# Patient Record
Sex: MALE | Race: BLACK OR AFRICAN AMERICAN | URBAN - METROPOLITAN AREA
[De-identification: names, ages, dates, MRNs, and addresses within clinical notes are randomized per-mention and may not be internally consistent; named-entity substitution may affect disease eponyms.]

---

## 2017-05-18 ENCOUNTER — OFFICE VISIT (OUTPATIENT)
Dept: INTERNAL MEDICINE | Facility: CLINIC | Age: 47
End: 2017-05-18
Payer: COMMERCIAL

## 2017-05-18 VITALS
HEART RATE: 67 BPM | TEMPERATURE: 98 F | BODY MASS INDEX: 26.83 KG/M2 | SYSTOLIC BLOOD PRESSURE: 132 MMHG | OXYGEN SATURATION: 99 % | DIASTOLIC BLOOD PRESSURE: 84 MMHG | WEIGHT: 177 LBS | HEIGHT: 68 IN

## 2017-05-18 DIAGNOSIS — R31.29 MICROSCOPIC HEMATURIA: ICD-10-CM

## 2017-05-18 DIAGNOSIS — R30.0 DYSURIA: Primary | ICD-10-CM

## 2017-05-18 LAB
ALBUMIN UR-MCNC: NEGATIVE MG/DL
ANION GAP SERPL CALCULATED.3IONS-SCNC: 9 MMOL/L (ref 3–14)
APPEARANCE UR: CLEAR
BACTERIA #/AREA URNS HPF: ABNORMAL /HPF
BILIRUB UR QL STRIP: NEGATIVE
BUN SERPL-MCNC: 16 MG/DL (ref 7–30)
CALCIUM SERPL-MCNC: 9.1 MG/DL (ref 8.5–10.1)
CHLORIDE SERPL-SCNC: 106 MMOL/L (ref 94–109)
CO2 SERPL-SCNC: 25 MMOL/L (ref 20–32)
COLOR UR AUTO: YELLOW
CREAT SERPL-MCNC: 0.81 MG/DL (ref 0.66–1.25)
ERYTHROCYTE [DISTWIDTH] IN BLOOD BY AUTOMATED COUNT: 13 % (ref 10–15)
GFR SERPL CREATININE-BSD FRML MDRD: NORMAL ML/MIN/1.7M2
GLUCOSE SERPL-MCNC: 82 MG/DL (ref 70–99)
GLUCOSE UR STRIP-MCNC: NEGATIVE MG/DL
HCT VFR BLD AUTO: 46 % (ref 40–53)
HGB BLD-MCNC: 15.8 G/DL (ref 13.3–17.7)
HGB UR QL STRIP: ABNORMAL
KETONES UR STRIP-MCNC: NEGATIVE MG/DL
LEUKOCYTE ESTERASE UR QL STRIP: NEGATIVE
MCH RBC QN AUTO: 29.4 PG (ref 26.5–33)
MCHC RBC AUTO-ENTMCNC: 34.3 G/DL (ref 31.5–36.5)
MCV RBC AUTO: 86 FL (ref 78–100)
NITRATE UR QL: NEGATIVE
PH UR STRIP: 5 PH (ref 5–7)
PLATELET # BLD AUTO: 201 10E9/L (ref 150–450)
POTASSIUM SERPL-SCNC: 3.6 MMOL/L (ref 3.4–5.3)
RBC # BLD AUTO: 5.38 10E12/L (ref 4.4–5.9)
RBC #/AREA URNS AUTO: ABNORMAL /HPF (ref 0–2)
SODIUM SERPL-SCNC: 140 MMOL/L (ref 133–144)
SP GR UR STRIP: 1.01 (ref 1–1.03)
URN SPEC COLLECT METH UR: ABNORMAL
UROBILINOGEN UR STRIP-ACNC: 0.2 EU/DL (ref 0.2–1)
WBC # BLD AUTO: 7.4 10E9/L (ref 4–11)
WBC #/AREA URNS AUTO: ABNORMAL /HPF (ref 0–2)

## 2017-05-18 PROCEDURE — 81001 URINALYSIS AUTO W/SCOPE: CPT | Performed by: NURSE PRACTITIONER

## 2017-05-18 PROCEDURE — 99203 OFFICE O/P NEW LOW 30 MIN: CPT | Performed by: NURSE PRACTITIONER

## 2017-05-18 PROCEDURE — 85027 COMPLETE CBC AUTOMATED: CPT | Performed by: NURSE PRACTITIONER

## 2017-05-18 ASSESSMENT — PAIN SCALES - GENERAL: PAINLEVEL: MILD PAIN (2)

## 2017-05-18 NOTE — PROGRESS NOTES
"  SUBJECTIVE:                                                    Tiffani Polo is a 46 year old male who presents to clinic today for the following health issues:      Left sided groin area pain for the past 4-5 weeks  Denies injury or trauma  Has pain with urination \"pressure\"  Denies hematuria  Complains of urinary frequency  Denies fever, chills, nausea, vomiting  Denies flank pain  Has 1 sexually partners. Declines STD testing  Denies any abdominal pain  BMs normal    Denies any previous medical history. Not on any medications    Problem list and histories reviewed & adjusted, as indicated.  Additional history: none    There is no problem list on file for this patient.    History reviewed. No pertinent surgical history.    Social History   Substance Use Topics     Smoking status: Never Smoker     Smokeless tobacco: Not on file     Alcohol use No     History reviewed. No pertinent family history.        Reviewed and updated as needed this visit by clinical staff       Reviewed and updated as needed this visit by Provider         ROS:  Constitutional, HEENT, cardiovascular, pulmonary, gi and gu systems are negative, except as otherwise noted.    OBJECTIVE:                                                    /84 (BP Location: Right arm, Patient Position: Chair, Cuff Size: Adult Regular)  Pulse 67  Temp 98  F (36.7  C) (Oral)  Ht 5' 8\" (1.727 m)  Wt 177 lb (80.3 kg)  SpO2 99%  BMI 26.91 kg/m2  Body mass index is 26.91 kg/(m^2).  GENERAL: healthy, alert and no distress  RESP: lungs clear to auscultation - no rales, rhonchi or wheezes  CV: regular rate and rhythm, normal S1 S2, no S3 or S4, no murmur, click or rub, no peripheral edema and peripheral pulses strong  ABDOMEN: soft, nontender, no hepatosplenomegaly, no masses and bowel sounds normal  RECTAL (male): normal sphincter tone, no rectal masses, prostate normal size, smooth, nontender without nodules or masses  BACK: no CVA tenderness, no paralumbar " tenderness  PSYCH: mentation appears normal, affect normal/bright    Diagnostic Test Results:  UA RESULTS:  Recent Labs   Lab Test  05/18/17   1200   COLOR  Yellow   APPEARANCE  Clear   URINEGLC  Negative   URINEBILI  Negative   URINEKETONE  Negative   SG  1.015   UBLD  Moderate*   URINEPH  5.0   PROTEIN  Negative   UROBILINOGEN  0.2   NITRITE  Negative   LEUKEST  Negative   RBCU  2-5*   WBCU  O - 2          ASSESSMENT/PLAN:                                                        ICD-10-CM    1. Dysuria R30.0 *UA reflex to Microscopic and Culture ([place] and [facility] (except [place] and [place])     Urine Microscopic     UROLOGY ADULT REFERRAL   2. Microscopic hematuria R31.29 CT Abdomen Pelvis Hematuria w/wo IV Contrast     UROLOGY ADULT REFERRAL     Basic metabolic panel     CBC with platelets     Concern for stone. Recommend CT and follow up with urology. Consider alpha blocker until can be seen by urology if CT shows nephrolithiasis.   Considered epididymitis but exam unremarkable. Would expect patient to be febrile and in acute pain if he had prostatitis, so rectal exam deferred    KYMBERLY Díaz [facility]

## 2017-05-18 NOTE — MR AVS SNAPSHOT
After Visit Summary   5/18/2017    Tiffani Polo    MRN: [id number]           Patient Information     Date Of Birth          1970        Visit Information        Provider Department      5/18/2017 11:40 AM Lashell Millan APRN CNP [facility]        Today's Diagnoses     Dysuria    -  1    Microscopic hematuria           Follow-ups after your visit        Additional Services     UROLOGY ADULT REFERRAL       Your provider has referred you to: [facility]: [facility] [phone]   [email or web address]    Please be aware that coverage of these services is subject to the terms and limitations of your health insurance plan.  Call member services at your health plan with any benefit or coverage questions.      Please bring the following with you to your appointment:    (1) Any X-Rays, CTs or MRIs which have been performed.  Contact the facility where they were done to arrange for  prior to your scheduled appointment.    (2) List of current medications  (3) This referral request   (4) Any documents/labs given to you for this referral                  Future tests that were ordered for you today     Open Future Orders        Priority Expected Expires Ordered    CT Abdomen Pelvis Hematuria w/wo IV Contrast Routine  5/18/2018 5/18/2017            Who to contact     If you have questions or need follow up information about today's clinic visit or your schedule please contact [facility] directly at [phone].  Normal or non-critical lab and imaging results will be communicated to you by MyChart, letter or phone within 4 business days after the clinic has received the results. If you do not hear from us within 7 days, please contact the clinic through MyChart or phone. If you have a critical or abnormal lab result, we will notify you by phone as soon as possible.  Submit refill requests through [employer] or  "call your pharmacy and they will forward the refill request to us. Please allow 3 business days for your refill to be completed.          Additional Information About Your Visit        MyChart Information     [employer] lets you send messages to your doctor, view your test results, renew your prescriptions, schedule appointments and more. To sign up, go to [email or web address] . Click on \"Log in\" on the left side of the screen, which will take you to the Welcome page. Then click on \"Sign up Now\" on the right side of the page.     You will be asked to enter the access code listed below, as well as some personal information. Please follow the directions to create your username and password.     Your access code is: [email or web address]  Expires: 2017 12:23 PM     Your access code will  in 90 days. If you need help or a new code, please call your [facility] clinic or [phone].        Care EveryWhere ID     This is your Care EveryWhere ID. This could be used by other organizations to access your [facility] medical records  [id number]        Your Vitals Were     Pulse Temperature Height Pulse Oximetry BMI (Body Mass Index)       67 98  F (36.7  C) (Oral) 5' 8\" (1.727 m) 99% 26.91 kg/m2        Blood Pressure from Last 3 Encounters:   17 132/84    Weight from Last 3 Encounters:   17 177 lb (80.3 kg)              We Performed the Following     *UA reflex to Microscopic and Culture ([place] and [facility] (except [place] and [place])     Basic metabolic panel     CBC with platelets     Urine Microscopic     UROLOGY ADULT REFERRAL        Primary Care Provider    None Specified       No primary provider on file.        Thank you!     Thank you for choosing [facility]  for your care. Our goal is always to provide you with excellent care. Hearing back from our patients is one way we can continue to improve our services. Please take a few minutes to complete the written survey that " you may receive in the mail after your visit with us. Thank you!             Your Updated Medication List - Protect others around you: Learn how to safely use, store and throw away your medicines at www.disposemymeds.org.      Notice  As of 5/18/2017 12:23 PM    You have not been prescribed any medications.

## 2017-05-18 NOTE — NURSING NOTE
"Chief Complaint   Patient presents with     Flank Pain     Health Maintenance       Initial /84 (BP Location: Right arm, Patient Position: Chair, Cuff Size: Adult Regular)  Pulse 67  Temp 98  F (36.7  C) (Oral)  Ht 5' 8\" (1.727 m)  Wt 177 lb (80.3 kg)  SpO2 99%  BMI 26.91 kg/m2 Estimated body mass index is 26.91 kg/(m^2) as calculated from the following:    Height as of this encounter: 5' 8\" (1.727 m).    Weight as of this encounter: 177 lb (80.3 kg).  Medication Reconciliation: complete   Deya Sosa CMA      "

## 2017-05-18 NOTE — LETTER
[facility]   [address] [place]  [address]  [phone]                                   May 19, 2017    Tiffani Polo  [address] [place] [facility] [address]        Dear Tiffani,    The results of your recent labs are enclosed.  Your labs look good.     Please call the clinic if you have any concerns.    Results for orders placed or performed in visit on 05/18/17   *UA reflex to Microscopic and Culture ([place] and [facility] (except [place] and [place])   Result Value Ref Range    Color Urine Yellow     Appearance Urine Clear     Glucose Urine Negative NEG mg/dL    Bilirubin Urine Negative NEG    Ketones Urine Negative NEG mg/dL    Specific Gravity Urine 1.015 1.003 - 1.035    Blood Urine Moderate (A) NEG    pH Urine 5.0 5.0 - 7.0 pH    Protein Albumin Urine Negative NEG mg/dL    Urobilinogen Urine 0.2 0.2 - 1.0 EU/dL    Nitrite Urine Negative NEG    Leukocyte Esterase Urine Negative NEG    Source Midstream Urine    Urine Microscopic   Result Value Ref Range    WBC Urine O - 2 0 - 2 /HPF    RBC Urine 2-5 (A) 0 - 2 /HPF    Bacteria Urine Few (A) NEG /HPF   Basic metabolic panel   Result Value Ref Range    Sodium 140 133 - 144 mmol/L    Potassium 3.6 3.4 - 5.3 mmol/L    Chloride 106 94 - 109 mmol/L    Carbon Dioxide 25 20 - 32 mmol/L    Anion Gap 9 3 - 14 mmol/L    Glucose 82 70 - 99 mg/dL    Urea Nitrogen 16 7 - 30 mg/dL    Creatinine 0.81 0.66 - 1.25 mg/dL    GFR Estimate >90  Non  GFR Calc   >60 mL/min/1.7m2    GFR Estimate If Black >90   GFR Calc   >60 mL/min/1.7m2    Calcium 9.1 8.5 - 10.1 mg/dL   CBC with platelets   Result Value Ref Range    WBC 7.4 4.0 - 11.0 10e9/L    RBC Count 5.38 4.4 - 5.9 10e12/L    Hemoglobin 15.8 13.3 - 17.7 g/dL    Hematocrit 46.0 40.0 - 53.0 %    MCV 86 78 - 100 fl    MCH 29.4 26.5 - 33.0 pg    MCHC 34.3 31.5 - 36.5 g/dL    RDW 13.0 10.0 - 15.0 %    Platelet Count 201 150 - 450 10e9/L       If  you have any questions please call the clinic at [phone]    Sincerely,    KYMBERLY Díaz CNP  [facility]d

## 2017-05-19 NOTE — PROGRESS NOTES
[address] [address]  Phone: [phone]      05/19/17    Tiffani Polo  [address] [place] [id number]      Dear Tiffani,    The results of your recent labs are enclosed.  Your labs look good.     Please call the clinic if you have any concerns.    Sincerely,    KYMBERLY Díaz CNP    Your [facility] Care Team

## 2017-05-22 ENCOUNTER — RADIANT APPOINTMENT (OUTPATIENT)
Dept: CT IMAGING | Facility: CLINIC | Age: 47
End: 2017-05-22
Attending: NURSE PRACTITIONER
Payer: COMMERCIAL

## 2017-05-22 DIAGNOSIS — R31.29 MICROSCOPIC HEMATURIA: ICD-10-CM

## 2017-05-22 PROCEDURE — 74176 CT ABD & PELVIS W/O CONTRAST: CPT | Mod: TC

## 2017-05-23 ENCOUNTER — TELEPHONE (OUTPATIENT)
Dept: INTERNAL MEDICINE | Facility: CLINIC | Age: 47
End: 2017-05-23

## 2017-05-23 NOTE — TELEPHONE ENCOUNTER
Received the following message from Lashell Millan CNP:     CT was negative and did not show a cause for his symptoms. I recommend he schedule an appointment with urology (referral was placed at clinic visit). May need a cystoscopy

## 2017-05-23 NOTE — TELEPHONE ENCOUNTER
Patient returned call to RN Triage line, left message to call him back, please call him at: [phone].        Jany Hernandes RN  [facility]

## 2017-05-23 NOTE — TELEPHONE ENCOUNTER
L/M for patient to call nurse line for results of test.  [phone]    Bárbara Bravo RN  [facility]          CT was negative and did not show a cause for his symptoms. I recommend he schedule an appointment with urology (referral was placed at clinic visit). May need a cystoscopy

## 2017-05-23 NOTE — PROGRESS NOTES
CT was negative and did not show a cause for his symptoms. I recommend he schedule an appointment with urology (referral was placed at clinic visit). May need a cystoscopy

## 2017-05-23 NOTE — TELEPHONE ENCOUNTER
Spoke to patient gave MD recommendations as below.    Bárbara Bravo RN  [facility]

## 2025-02-04 ENCOUNTER — TELEPHONE (OUTPATIENT)
Dept: GASTROENTEROLOGY | Facility: CLINIC | Age: 55
End: 2025-02-04
Payer: COMMERCIAL

## 2025-02-04 NOTE — TELEPHONE ENCOUNTER
"Endoscopy Scheduling Screen    Have you had any respiratory illness or flu-like symptoms in the last 10 days?  No    What is your communication preference for Instructions and/or Bowel Prep?   Mail/USPS    What insurance is in the chart?  Other:  bcbs     Ordering/Referring Provider:     LYNNE SMITH       (If ordering provider performs procedure, schedule with ordering provider unless otherwise instructed. )    BMI: Estimated body mass index is 26.15 kg/m  as calculated from the following:    Height as of 1/30/25: 1.753 m (5' 9\").    Weight as of 1/30/25: 80.3 kg (177 lb 1.6 oz).     Sedation Ordered  moderate sedation.   If patient BMI > 50 do not schedule in [facility].    If patient BMI > 45 do not schedule at [facility].    Are you taking methadone or Suboxone?  NO, No RN review required.    Have you been diagnosed and are being treated for severe PTSD or severe anxiety?  NO, No RN review required.    Are you taking any prescription medications for pain 3 or more times per week?   NO, No RN review required.    Do you have a history of malignant hyperthermia?  No    (Females) Are you currently pregnant?   No     Have you been diagnosed or told you have pulmonary hypertension?   No    Do you have an LVAD?  No    Have you been told you have moderate to severe sleep apnea?  No.    Have you been told you have COPD, asthma, or any other lung disease?  No    Do you have any heart conditions?  No     Have you ever had or are you waiting for an organ transplant?  No. Continue scheduling, no site restrictions.    Have you had a stroke or transient ischemic attack (TIA aka \"mini stroke\" in the last 6 months?   No    Have you been diagnosed with or been told you have cirrhosis of the liver?   No.    Are you currently on dialysis?   No    Do you need assistance transferring?   No    BMI: Estimated body mass index is 26.15 kg/m  as calculated from the following:    Height as of 1/30/25: 1.753 m (5' 9\").    Weight as of 1/30/25: " 80.3 kg (177 lb 1.6 oz).     Is patients BMI > 40 and scheduling location [facility]?  No    Do you take an injectable or oral medication for weight loss or diabetes (excluding insulin)?  No    Do you take the medication Naltrexone?  No    Do you take blood thinners?  No       Prep   Are you currently on dialysis or do you have chronic kidney disease?  No    Do you have a diagnosis of diabetes?  No    Do you have a diagnosis of cystic fibrosis (CF)?  No    On a regular basis do you go 3 -5 days between bowel movements?  No    BMI > 40?  No    Preferred Pharmacy:    [id number] - [place], [place] - [address] AT [facility] [address]  [address]  [facility] [address]  Phone: [phone] Fax: [phone]      Final Scheduling Details     Procedure scheduled  Colonoscopy    Surgeon:  Purvi     Date of procedure:  03/21/2025     Pre-OP / PAC:   No - Not required for this site.    Location  [facility] - Per order.    Sedation   Moderate Sedation - Per order.      Patient Reminders:   You will receive a call from a Nurse to review instructions and health history.  This assessment must be completed prior to your procedure.  Failure to complete the Nurse assessment may result in the procedure being cancelled.      On the day of your procedure, please designate an adult(s) who can drive you home stay with you for the next 24 hours. The medicines used in the exam will make you sleepy. You will not be able to drive.      You cannot take public transportation, ride share services, or non-medical taxi service without a responsible caregiver.  Medical transport services are allowed with the requirement that a responsible caregiver will receive you at your destination.  We require that drivers and caregivers are confirmed prior to your procedure.

## 2025-03-05 ENCOUNTER — TELEPHONE (OUTPATIENT)
Dept: GASTROENTEROLOGY | Facility: CLINIC | Age: 55
End: 2025-03-05
Payer: COMMERCIAL

## 2025-03-05 NOTE — LETTER
March 5, 2025      [facility]  [address]  [facility] [address]              Dear Tiffani,          Colonoscopy     Procedure date: 3-21-25    Anticipated arrival time: 1:15 PM   (Please note that arrival times may change)    Facility location: [facility]; [address] [place] [address] - Check in location: 1st Floor Skyway lobby. Parking information: Self pay parking available in [facility]. The [facility] Ramp is  located across [facility] from the hospital and is connected to the  hospital by a skyway. The skyway access is on Level C of the parking ramp.   parking is available Monday through Friday from 5:30 a.m.-5 p.m.  parking attendants are stationed at [address] [facility]  located off of [address]    Important Procedure Reminders:     Prep Instructions:   Instructions on how to prepare for your upcoming procedure are found below. Please read instructions carefully. Deviation from instructions may result in less than desired outcomes and procedure may need to be rescheduled.   If you have additional questions regarding how to prepare for your upcoming procedure, contact our endoscopy pre assessment nurses at [phone] option 3 Monday through Friday 7:00am-5:00pm.        Policy:   The medications used during the procedure will make you sleepy, so you won't be able to drive. On the day of your procedure, please have an adult ready to drive you home and stay with you for the next 6 hours.   You can't use public transportation, ride-share services, or non-medical taxi services without a responsible caregiver. Medical transport services are okay, but a caregiver must be there to receive you at your destination.   Make sure your  and caregiver are confirmed before your procedure.    Day of procedure:  Please keep in mind that arrival times may change. Let your  know there might be a one-hour window for changes.  We ask that you please  check in at the  with your . Your  should remain on campus.  Expect to be at the procedure center for about 1.5-2.5 hours.    Please do not wear jewelry (i.e. earrings, rings, necklaces, watches, etc). Leave your purse, billfold, credit cards, and other valuables at home.   Bring insurance card and ID.     To cancel or reschedule your procedure:   If you need to cancel or reschedule, our endoscopy scheduling team can be reached at [phone], option 2. Monday through Friday, 7:00am-5:00pm.    Medication Reminders:    Please note the following medication holding recommendations:   N/A    ---------------------------------------------------------------------------------------------------------------------------------------------------------------------------------------------------------------        Standard Miralax Bowel Prep   Prep instructions for your colonoscopy       [facility]; [address] [place] [address] - Check in location: [facility].   For prep questions, please call: [facility] - [phone] option 3      Please read these instructions carefully at least 7 days prior to your colonoscopy procedure. Be sure to follow all directions completely. The inside of your colon must be clean to allow for a complete examination for the presence of any growths, polyps, and/or abnormalities, as well as their biopsy or removal. A number of tips are included in order to make this part of the procedure as comfortable as possible.              Getting ready   Purchase the following items over-the-counter/off the shelf at the drug store:    Four (4) - Dulcolax laxative (Bisacodyl) 5mg tablets (Do not use Dulcolax stool softener)   8.3 ounce bottle of Miralax powder (ClearLAX, SmoothLAX, PowderLAX)  64 ounces of Gatorade or similar sports drink. Not red or purple. (Pedialyte, Propel, Gatorade G2/Zero, Powerade, Powerade Zero)   10 ounce bottle of clear Magnesium  Citrate    A nurse will call you to go over your appointment details and prep instructions.    You must arrange for an adult to drive you home after your exam. Your colonoscopy cannot be done unless you have a ride. If you need to use public transportation, someone must ride with you and stay with you for up to 24 hours.       7 days before procedure   Medications that may need to be held before procedure:     GLP-1 agonist medication for diabetes or weight loss: such as Mounjaro (Tirzepatide).  Ozempic (Semaglutide). Rybelsus (Semaglutide), Tirzepatide-Weight Management (Zepbound), Wegovy (Semaglutide) or others, holding times may vary based on how you take this medication. This may be up to a 7 day hold. Our pre assessment nurses will call and discuss holding recommendations 1-2 weeks before scheduled procedure.     Blood thinning and/or anti platelet medications: such as Coumadin, Plavix, Xarelto, Eliquis, Lovenox or others, ask your your prescribing provider about holding recommendations.     If you take insulin for diabetes, ask your prescribing provider for instructions on how to manage this medication while preparing for a colonoscopy.     Stop taking iron (ferrous sulfate), multivitamins that contain iron, and/or fiber supplements (Metamucil, Benefiber, Psyllium husk powder, Fibercon, Bran, etc.) 7 days before procedure.     Stop eating whole kernel corn, popcorn, nuts, and foods that contain seeds. These can stay in the colon for many days and they can clog up the colonoscope.                                 3 days before procedure     Begin a low-fiber diet (see examples below). No Olestra (a fat substitute).    Consume no more than 10-15 grams of fiber each day.     It is important to stay hydrated. Drink at least eight 8-ounce glasses of water a day.      LOW FIBER DIET   You can have:   Do not have:    Starches: White bread, rolls, biscuits, croissants, [place] toast, white flour tortillas, waffles,  pancakes, [place] toast; white rice, noodles, pasta, macaroni; cooked and peeled potatoes; plain crackers, saltines; cooked farina or cream of rice; puffed rice, corn flakes, Rice Krispies, Special K      Vegetables: tender cooked and canned, vegetable broths     Fruits and fruit juices: Strained fruit juice, canned fruit without seeds or skin (not pineapple), applesauce, pear sauce, ripe bananas, melons (not watermelon)     Milk products: Milk (plain or flavored), cheese, cottage cheese, yogurt (no berries), custard, ice cream       Proteins: Tender, well-cooked ground beef, lamb, veal, ham, pork, chicken, turkey, fish or organ meat, Tofu, eggs, creamy peanut butter      Fats and condiments:  Margarine, butter, oils, mayonnaise, sour cream, salad dressing, plain gravy; spices, cooked herbs; sugar, clear jelly, honey, syrup      Snacks, sweets and drinks: Pretzels, hard candy; plain cakes and cookies (no nuts or seeds); gelatin, plain pudding, sherbet, Popsicles; coffee, tea, carbonated ( fizzy ) drinks  Starches: Breads or rolls that contain nuts, seeds or fruit; whole wheat or whole grain breads that contain more than 2 grams of fiber per serving; cornbread; corn or whole wheat tortillas; potatoes with skin; brown rice, wild rice, quinoa, kasha (buckwheat), and oatmeal      Vegetables: Any raw or steamed vegetables; vegetables with seeds; corn in any form      Fruits and fruit juices: Prunes, prune juice, raisins and other dried fruits, berries and other fruits with seeds, canned pineapple juices with pulp such as orange, grapefruit, pineapple or tomato juice     Milk products: Any yogurt with nuts, seeds or berries      Proteins: Tough, fibrous meats with gristle; cooked dried beans, peas or lentils; crunchy peanut butter     Fats and condiments: Pickles, olives, relish, horseradish; jam, marmalade, preserves      Snacks, sweets and drinks: Popcorn, nuts, seeds, granola, coconut, candies made with nuts or seeds;  all desserts that contain nuts, seeds, raisins and other dried fruits, coconut, whole grains or bran.               1 day before procedure     Start a clear liquid diet (see examples below). Do not eat any solid food.      Drink at least eight to ten 8-ounce glasses of water throughout the day. ? ? ? ? ? ? ? ?    CLEAR LIQUID DIET:  You can have: Do not have:    Water, tea, coffee (no milk or cream)   Soda pop, Gatorade (not red or purple)   Coconut water   Jell-O, Popsicles (no milk or fruit pieces - not red or purple)   Fat-free soup broth or bouillon   Plain hard candy, such as clear life savers (not red or purple)   Clear juices and fruit-flavored drinks, such as apple juice, white grape juice, Hi-C, and Demetrio-Aid (not red or purple)  Milk or milk products such as ice cream, malts or shakes, or coffee creamer   Red or purple drinks of any kind such as cranberry juice, grape juice, or Demetrio-Aid. Avoid red or purple Jell-O, Popsicles, sorbet, sherbet, and candy.   Juices with pulp such as orange, grapefruit, pineapple, or tomato juice   Cream soups of any kind   Alcohol and beer   Protein drinks or protein powder     Step 1     At 4 PM, take 2 Dulcolax (Bisacodyl) tablets.   At 5 PM, mix the entire bottle of Miralax with 64 ounces of Gatorade in a pitcher and stir to dissolve the powder. Start drinking one 8-ounce glass of the Miralax and Gatorade mixture every 15 minutes until the pitcher is HALF empty (about 4 glasses). Drink each glass quickly. Store the rest in the refrigerator.   Continue to drink clear liquids.    Step 2     At 10 PM, take 2 Dulcolax (Bisacodyl) tablets.  At 10 PM start drinking one 8-ounce glass of Miralax and Gatorade mixture every 15 minutes until the pitcher is empty (about 4 glasses). Drink each glass quickly.    Step 3       If you arrive for your procedure AFTER 11 AM:  At 6 AM on the day of the exam drink 10 ounces of clear Magnesium Citrate               Reminders While Drinking  Laxatives:     After you start drinking the solution, stay near a toilet. You may have watery stools (diarrhea), mild cramping, bloating, and nausea. You may want to use Vaseline on the skin around your anus after each bowel movement or use wet wipes to prevent irritation. Bowel movements will be liquid and dark in color at first and then should turn clear yellow in color.      Some find it easier to drink the Miralax and Gatorade mixture when it is chilled. Do not add ice as this will dilute the laxative. Drinking from a straw can be helpful to drink the liquid faster.     If you have nausea or vomiting during drinking the solution, rinse your mouth with water and take a 15-30 minute break and then continue drinking solution.       Day of procedure     2 hours before your arrival time stop drinking all liquids, including water.   Do not smoke or swallow anything, including water or gum for at least 2 hours before your arrival time. This is a safety issue. Your procedure could be cancelled if you do not follow directions.  No chewing tobacco 6 hours prior to procedure arrival time.     You may take your necessary morning medications with sips of water (4 ounces).   Do not take diabetes medicine by mouth until after your exam.  If you have asthma, bring your inhalers.  Please perform your nebulizer treatments and airway clearance therapy in the morning prior to the procedure (if applicable).    Arrive with a responsible adult who can drive you home and stay with you for up to 24 hours. The medications used during the procedure will make you sleepy, so you won't be able to drive yourself home.   You cannot use public transportation, ride-share services, or non-medical taxi services without a responsible caregiver. Medical transport services are okay, but a caregiver must be there to receive you at your destination.  Please check in with your  when you arrive. Drivers should stay on campus.    Expect to be at the  procedure center for about 1.5-2.5 hours.    Do not wear jewelry (i.e. earrings, rings, necklaces, watches, etc.). Leave your purse, billfold, credit cards, and other valuables at home.      Bring insurance card and ID.                       Answers to Commonly Asked Questions     How soon can I eat after the procedure?  You may resume your normal diet when you feel ready, unless advised otherwise by the doctor performing your procedure. We recommend starting with a light meal.   Do not drink alcohol for 24 hours after your procedure.  You may resume normal activities (work, exercise, etc.) after 24 hours.    How might I feel after the procedure?  It is normal to feel bloated and gassy after your procedure. Walking will help move the air through your colon. You can take non-aspirin pain relievers that contain acetaminophen (Tylenol).  If you are having sedation, we require a responsible adult to take you home for your safety. The sedation medicines used to relax you during the procedure can impair your judgement and reaction time, and make you forgetful and possibly a little unsteady.  Do not drive, make any important decisions, or sign any legal documents for 24 hours after your procedure.    When will I get my test results?  You should have your procedure results and any lab results (if applicable) by letter, MyChart message, or phone call within 2 weeks. If you have any questions, please call the doctor that referred you for the procedure.    How do I know if my colon is cleaned out?   After completing the bowel prep, your bowel movements should be all liquid and yellow. Your bowel movements will look similar to urine in the toilet. If there are pieces of stool (poop) in the toilet, or if you can't see to the bottom of the toilet, please call our office for advice. Call [phone] and ask to speak with a nurse.    Why is the Miralax bowel prep taken in several steps?   The stool is flushed out by a large wave  of fluid going through the colon. Just sipping a large volume of the solution will not achieve the desired result. Studies have shown that two smaller waves (or more in some cases) are better than one large one.      Why do I need to drink the magnesium citrate so close to the procedure arrival time?   The intestine continues to produce mucus and waste. Longer intervals between the prep and the exam can lead to less than desired results. However, the stomach must be empty at the time of the exam in order to allow safe sedation. Therefore, there should be nothing by mouth 2 hours before the exam is started.    What if I need to cancel or reschedule my procedure?  Contact our endoscopy scheduling team at [phone], option 2. Monday through Friday, 7:00am-5:00pm.

## 2025-03-05 NOTE — TELEPHONE ENCOUNTER
Pre visit planning completed.      Procedure details:    Patient scheduled for Colonoscopy on 3-21-25.     Arrival time: 1315. Procedure time 1400    Facility location: [facility]; [facility] [place]note 2[facility] [place] [address]. Check in location: 1st Floor [facility] [place].     Sedation type: Conscious sedation     Pre op exam needed? No.    Indication for procedure: screening       Chart review:     Electronic implanted devices? No    Recent diagnosis of diverticulitis within the last 6 weeks? No      Medication review:    Diabetic? No    Anticoagulants? No    Weight loss medication/injectable? No GLP-1 medication per patient's medication list. Nursing to verify with pre-assessment call.    Other medication HOLDING recommendations:  N/A      Prep for procedure:     Bowel prep recommendation: Standard Miralax.   Due to: standard bowel prep    Procedure information and instructions sent via letter and ([employer] email/text link)         Shelly Grigsby RN  Endoscopy Procedure Pre Assessment   [phone] option 3

## 2025-03-05 NOTE — TELEPHONE ENCOUNTER
Attempted to contact patient in order to complete pre assessment questions.     No answer. Left message to return call to [phone] option 3.    Callback communication sent via voicemail due to [employer] inactive.    Margoth Phan RN

## 2025-03-21 ENCOUNTER — HOSPITAL ENCOUNTER (OUTPATIENT)
Facility: CLINIC | Age: 55
Discharge: HOME | End: 2025-03-21
Attending: COLON & RECTAL SURGERY | Admitting: COLON & RECTAL SURGERY
Payer: COMMERCIAL

## 2025-03-21 VITALS
HEART RATE: 70 BPM | DIASTOLIC BLOOD PRESSURE: 93 MMHG | RESPIRATION RATE: 8 BRPM | HEIGHT: 68 IN | OXYGEN SATURATION: 97 % | BODY MASS INDEX: 26.37 KG/M2 | SYSTOLIC BLOOD PRESSURE: 143 MMHG | WEIGHT: 174 LBS

## 2025-03-21 LAB — COLONOSCOPY: NORMAL

## 2025-03-21 PROCEDURE — 88305 TISSUE EXAM BY PATHOLOGIST: CPT | Mod: 26 | Performed by: PATHOLOGY

## 2025-03-21 PROCEDURE — 88305 TISSUE EXAM BY PATHOLOGIST: CPT | Mod: TC | Performed by: COLON & RECTAL SURGERY

## 2025-03-21 PROCEDURE — 250N000011 HC RX IP 250 OP 636: Performed by: COLON & RECTAL SURGERY

## 2025-03-21 PROCEDURE — G0500 MOD SEDAT ENDO SERVICE >5YRS: HCPCS | Performed by: COLON & RECTAL SURGERY

## 2025-03-21 PROCEDURE — 45385 COLONOSCOPY W/LESION REMOVAL: CPT | Performed by: COLON & RECTAL SURGERY

## 2025-03-21 RX ORDER — FENTANYL CITRATE 50 UG/ML
INJECTION, SOLUTION INTRAMUSCULAR; INTRAVENOUS PRN
Status: DISCONTINUED | OUTPATIENT
Start: 2025-03-21 | End: 2025-03-21 | Stop reason: HOSPADM

## 2025-03-21 ASSESSMENT — ACTIVITIES OF DAILY LIVING (ADL)
ADLS_ACUITY_SCORE: 41
ADLS_ACUITY_SCORE: 41

## 2025-03-21 NOTE — H&P
"Pre-Endoscopy History and Physical     Tiffani Polo MRN# [id number]   YOB: 1970 Age: 54 year old     Date of Procedure: 3/21/2025  Primary care provider: Carol oLya  Type of Endoscopy: Colonoscopy  Reason for Procedure: screening  Type of Anesthesia Anticipated: Moderate Sedation    HPI:    Tiffani is a 54 year old male who will be undergoing the above procedure.      A history and physical has been performed. The patient's medications and allergies have been reviewed. The risks and benefits of the procedure and the sedation options and risks were discussed with the patient.  All questions were answered and informed consent was obtained.      He denies a personal or family history of anesthesia complications or bleeding disorders.     No Known Allergies     No current facility-administered medications for this encounter.       There is no problem list on file for this patient.       History reviewed. No pertinent past medical history.     History reviewed. No pertinent surgical history.    Social History     Tobacco Use    Smoking status: Never    Smokeless tobacco: Never   Substance Use Topics    Alcohol use: No       History reviewed. No pertinent family history.    REVIEW OF SYSTEMS:     5 point ROS negative except as noted above in HPI, including Gen., Resp., CV, GI &  system review.      PHYSICAL EXAM:   BP (!) 152/93   Pulse 74   Resp 13   Ht 1.727 m (5' 8\")   Wt 78.9 kg (174 lb)   SpO2 99%   BMI 26.46 kg/m   Estimated body mass index is 26.46 kg/m  as calculated from the following:    Height as of this encounter: 1.727 m (5' 8\").    Weight as of this encounter: 78.9 kg (174 lb).   GENERAL APPEARANCE: healthy and alert  MENTAL STATUS: alert  AIRWAY EXAM: Mallampatti Class I (visualization of the soft palate, fauces, uvula, anterior and posterior pillars)  RESP: lungs clear to auscultation - no rales, rhonchi or wheezes  CV: regular rates and rhythm      IMPRESSION   ASA " Class 1 - Healthy patient, no medical problems        PLAN:     Plan for colonoscopy. We discussed the risks, benefits and alternatives and the patient wished to proceed.    The above has been forwarded to the consulting provider.      Manish Loja MD  [facility]  Phone: [phone]  Fax: [phone]  March 21, 2025

## 2025-03-25 LAB
PATH REPORT.COMMENTS IMP SPEC: NORMAL
PATH REPORT.COMMENTS IMP SPEC: NORMAL
PATH REPORT.FINAL DX SPEC: NORMAL
PATH REPORT.GROSS SPEC: NORMAL
PATH REPORT.MICROSCOPIC SPEC OTHER STN: NORMAL
PATH REPORT.RELEVANT HX SPEC: NORMAL
PHOTO IMAGE: NORMAL

## 2025-03-28 PROBLEM — D12.6 ADENOMATOUS POLYP OF COLON: Status: ACTIVE | Noted: 2025-03-28

## 2025-03-31 ENCOUNTER — PATIENT OUTREACH (OUTPATIENT)
Dept: GASTROENTEROLOGY | Facility: CLINIC | Age: 55
End: 2025-03-31
Payer: COMMERCIAL

## (undated) RX ORDER — FENTANYL CITRATE 50 UG/ML
INJECTION, SOLUTION INTRAMUSCULAR; INTRAVENOUS
Status: DISPENSED
Start: 2025-03-21